# Patient Record
Sex: MALE | Race: WHITE | URBAN - METROPOLITAN AREA
[De-identification: names, ages, dates, MRNs, and addresses within clinical notes are randomized per-mention and may not be internally consistent; named-entity substitution may affect disease eponyms.]

---

## 2018-03-01 ENCOUNTER — APPOINTMENT (RX ONLY)
Dept: URBAN - METROPOLITAN AREA CLINIC 53 | Facility: CLINIC | Age: 73
Setting detail: DERMATOLOGY
End: 2018-03-01

## 2018-03-01 DIAGNOSIS — Z85.828 PERSONAL HISTORY OF OTHER MALIGNANT NEOPLASM OF SKIN: ICD-10-CM

## 2018-03-01 DIAGNOSIS — D22 MELANOCYTIC NEVI: ICD-10-CM

## 2018-03-01 DIAGNOSIS — L81.4 OTHER MELANIN HYPERPIGMENTATION: ICD-10-CM

## 2018-03-01 DIAGNOSIS — L82.1 OTHER SEBORRHEIC KERATOSIS: ICD-10-CM

## 2018-03-01 DIAGNOSIS — L21.8 OTHER SEBORRHEIC DERMATITIS: ICD-10-CM | Status: WELL CONTROLLED

## 2018-03-01 PROBLEM — D22.5 MELANOCYTIC NEVI OF TRUNK: Status: ACTIVE | Noted: 2018-03-01

## 2018-03-01 PROBLEM — I10 ESSENTIAL (PRIMARY) HYPERTENSION: Status: ACTIVE | Noted: 2018-03-01

## 2018-03-01 PROCEDURE — 99213 OFFICE O/P EST LOW 20 MIN: CPT

## 2018-03-01 PROCEDURE — ? COUNSELING

## 2018-03-01 ASSESSMENT — LOCATION SIMPLE DESCRIPTION DERM
LOCATION SIMPLE: SCALP
LOCATION SIMPLE: LEFT CHEEK
LOCATION SIMPLE: LEFT TEMPLE
LOCATION SIMPLE: CHEST
LOCATION SIMPLE: RIGHT UPPER BACK
LOCATION SIMPLE: RIGHT THIGH
LOCATION SIMPLE: UPPER BACK
LOCATION SIMPLE: ABDOMEN
LOCATION SIMPLE: LEFT UPPER ARM
LOCATION SIMPLE: RIGHT UPPER ARM
LOCATION SIMPLE: LEFT THIGH
LOCATION SIMPLE: RIGHT CHEEK

## 2018-03-01 ASSESSMENT — LOCATION DETAILED DESCRIPTION DERM
LOCATION DETAILED: LEFT MEDIAL SUPERIOR CHEST
LOCATION DETAILED: LEFT CENTRAL TEMPLE
LOCATION DETAILED: RIGHT MEDIAL MALAR CHEEK
LOCATION DETAILED: LEFT PROXIMAL POSTERIOR UPPER ARM
LOCATION DETAILED: RIGHT PROXIMAL POSTERIOR UPPER ARM
LOCATION DETAILED: LEFT ANTERIOR PROXIMAL THIGH
LOCATION DETAILED: LEFT SUPERIOR PARIETAL SCALP
LOCATION DETAILED: EPIGASTRIC SKIN
LOCATION DETAILED: INFERIOR THORACIC SPINE
LOCATION DETAILED: LEFT ANTERIOR PROXIMAL UPPER ARM
LOCATION DETAILED: LEFT MEDIAL MALAR CHEEK
LOCATION DETAILED: RIGHT ANTERIOR PROXIMAL UPPER ARM
LOCATION DETAILED: RIGHT ANTERIOR PROXIMAL THIGH
LOCATION DETAILED: RIGHT SUPERIOR MEDIAL UPPER BACK

## 2018-03-01 ASSESSMENT — LOCATION ZONE DERM
LOCATION ZONE: SCALP
LOCATION ZONE: LEG
LOCATION ZONE: ARM
LOCATION ZONE: TRUNK
LOCATION ZONE: FACE
LOCATION ZONE: FACE

## 2019-02-21 ENCOUNTER — APPOINTMENT (RX ONLY)
Dept: URBAN - METROPOLITAN AREA CLINIC 53 | Facility: CLINIC | Age: 74
Setting detail: DERMATOLOGY
End: 2019-02-21

## 2019-02-21 DIAGNOSIS — L57.0 ACTINIC KERATOSIS: ICD-10-CM

## 2019-02-21 DIAGNOSIS — L81.4 OTHER MELANIN HYPERPIGMENTATION: ICD-10-CM

## 2019-02-21 DIAGNOSIS — L82.0 INFLAMED SEBORRHEIC KERATOSIS: ICD-10-CM

## 2019-02-21 DIAGNOSIS — L82.1 OTHER SEBORRHEIC KERATOSIS: ICD-10-CM

## 2019-02-21 DIAGNOSIS — Z85.828 PERSONAL HISTORY OF OTHER MALIGNANT NEOPLASM OF SKIN: ICD-10-CM

## 2019-02-21 PROCEDURE — ? LIQUID NITROGEN

## 2019-02-21 PROCEDURE — ? INVENTORY

## 2019-02-21 PROCEDURE — 17000 DESTRUCT PREMALG LESION: CPT | Mod: 59

## 2019-02-21 PROCEDURE — ? BIOPSY BY SHAVE METHOD

## 2019-02-21 PROCEDURE — 99214 OFFICE O/P EST MOD 30 MIN: CPT | Mod: 25

## 2019-02-21 PROCEDURE — ? COUNSELING

## 2019-02-21 PROCEDURE — 11102 TANGNTL BX SKIN SINGLE LES: CPT

## 2019-02-21 ASSESSMENT — LOCATION SIMPLE DESCRIPTION DERM
LOCATION SIMPLE: RIGHT UPPER BACK
LOCATION SIMPLE: UPPER BACK
LOCATION SIMPLE: RIGHT THIGH
LOCATION SIMPLE: RIGHT CALF

## 2019-02-21 ASSESSMENT — LOCATION DETAILED DESCRIPTION DERM
LOCATION DETAILED: RIGHT SUPERIOR MEDIAL UPPER BACK
LOCATION DETAILED: RIGHT ANTERIOR PROXIMAL THIGH
LOCATION DETAILED: RIGHT PROXIMAL CALF
LOCATION DETAILED: SUPERIOR THORACIC SPINE

## 2019-02-21 ASSESSMENT — LOCATION ZONE DERM
LOCATION ZONE: LEG
LOCATION ZONE: TRUNK

## 2019-02-21 NOTE — PROCEDURE: BIOPSY BY SHAVE METHOD
Cryotherapy Text: The wound bed was treated with cryotherapy after the biopsy was performed.
Destruction After The Procedure: No
Consent: Written consent was obtained and risks were reviewed including but not limited to scarring, infection, bleeding, scabbing, incomplete removal, nerve damage and allergy to anesthesia.
Wound Care: Petrolatum
Lab Facility: [id number]
Additional Anesthesia Volume In Cc (Will Not Render If 0): 0
Anesthesia Type: 1% lidocaine with epinephrine
Depth Of Biopsy: dermis
Electrodesiccation Text: The wound bed was treated with electrodesiccation after the biopsy was performed.
Biopsy Type: H and E
Billing Type: Third-Party Bill
Type Of Destruction Used: Curettage
Dressing: bandage
Electrodesiccation And Curettage Text: The wound bed was treated with electrodesiccation and curettage after the biopsy was performed.
Anesthesia Volume In Cc (Will Not Render If 0): 0.5
Was A Bandage Applied: Yes
Post-Care Instructions: I reviewed with the patient in detail post-care instructions. Patient is to keep the biopsy site dry overnight, and then apply bacitracin twice daily until healed. Patient may apply hydrogen peroxide soaks to remove any crusting.
Curettage Text: The wound bed was treated with curettage after the biopsy was performed.
Notification Instructions: Patient will be notified of biopsy results. However, patient instructed to call the office if not contacted within 2 weeks.
Hemostasis: Electrocautery and Aluminum Chloride
Silver Nitrate Text: The wound bed was treated with silver nitrate after the biopsy was performed.
Lab: 6
Detail Level: Detailed
Biopsy Method: double edge Personna blade

## 2019-02-21 NOTE — PROCEDURE: LIQUID NITROGEN
Consent: The patient's consent was obtained including but not limited to risks of crusting, scabbing, blistering, scarring, darker or lighter pigmentary change, recurrence, incomplete removal and infection.
Detail Level: Detailed
Number Of Freeze-Thaw Cycles: 1 freeze-thaw cycle
Render Post-Care Instructions In Note?: no
Post-Care Instructions: I reviewed with the patient in detail post-care instructions. Patient is to wear sunprotection, and avoid picking at any of the treated lesions. Pt may apply Vaseline to crusted or scabbing areas.
Duration Of Freeze Thaw-Cycle (Seconds): 3

## 2020-02-24 ENCOUNTER — APPOINTMENT (RX ONLY)
Dept: URBAN - METROPOLITAN AREA CLINIC 53 | Facility: CLINIC | Age: 75
Setting detail: DERMATOLOGY
End: 2020-02-24

## 2020-02-24 DIAGNOSIS — L81.4 OTHER MELANIN HYPERPIGMENTATION: ICD-10-CM

## 2020-02-24 DIAGNOSIS — D22 MELANOCYTIC NEVI: ICD-10-CM

## 2020-02-24 DIAGNOSIS — L82.1 OTHER SEBORRHEIC KERATOSIS: ICD-10-CM

## 2020-02-24 DIAGNOSIS — Z85.828 PERSONAL HISTORY OF OTHER MALIGNANT NEOPLASM OF SKIN: ICD-10-CM | Status: RESOLVED

## 2020-02-24 PROBLEM — D22.5 MELANOCYTIC NEVI OF TRUNK: Status: ACTIVE | Noted: 2020-02-24

## 2020-02-24 PROBLEM — D48.5 NEOPLASM OF UNCERTAIN BEHAVIOR OF SKIN: Status: ACTIVE | Noted: 2020-02-24

## 2020-02-24 PROCEDURE — ? BIOPSY BY SHAVE METHOD

## 2020-02-24 PROCEDURE — 99214 OFFICE O/P EST MOD 30 MIN: CPT | Mod: 25

## 2020-02-24 PROCEDURE — ? COUNSELING

## 2020-02-24 PROCEDURE — 11102 TANGNTL BX SKIN SINGLE LES: CPT

## 2020-02-24 PROCEDURE — ? FULL BODY SKIN EXAM

## 2020-02-24 ASSESSMENT — LOCATION DETAILED DESCRIPTION DERM
LOCATION DETAILED: SUPERIOR THORACIC SPINE
LOCATION DETAILED: INFERIOR THORACIC SPINE

## 2020-02-24 ASSESSMENT — LOCATION ZONE DERM: LOCATION ZONE: TRUNK

## 2020-02-24 ASSESSMENT — LOCATION SIMPLE DESCRIPTION DERM: LOCATION SIMPLE: UPPER BACK

## 2020-02-24 NOTE — PROCEDURE: BIOPSY BY SHAVE METHOD
Detail Level: Detailed
Depth Of Biopsy: dermis
Was A Bandage Applied: Yes
Size Of Lesion In Cm: 0.3
X Size Of Lesion In Cm: 0
Anticipated Plan (Based On Presumed Biopsy Results): Mohs
Biopsy Type: H and E
Biopsy Method: double edge Personna blade
Anesthesia Type: 1% lidocaine with epinephrine
Anesthesia Volume In Cc (Will Not Render If 0): 0.5
Hemostasis: Electrocautery and Aluminum Chloride
Wound Care: Petrolatum
Dressing: bandage
Destruction After The Procedure: No
Type Of Destruction Used: Curettage
Curettage Text: The wound bed was treated with curettage after the biopsy was performed.
Cryotherapy Text: The wound bed was treated with cryotherapy after the biopsy was performed.
Electrodesiccation Text: The wound bed was treated with electrodesiccation after the biopsy was performed.
Electrodesiccation And Curettage Text: The wound bed was treated with electrodesiccation and curettage after the biopsy was performed.
Silver Nitrate Text: The wound bed was treated with silver nitrate after the biopsy was performed.
Lab: 6
Lab Facility: [id number]
Consent: Written consent was obtained and risks were reviewed including but not limited to scarring, infection, bleeding, scabbing, incomplete removal, nerve damage and allergy to anesthesia.
Post-Care Instructions: I reviewed with the patient in detail post-care instructions. Patient is to keep the biopsy site dry overnight, and then apply bacitracin twice daily until healed. Patient may apply hydrogen peroxide soaks to remove any crusting.
Notification Instructions: Patient will be notified of biopsy results. However, patient instructed to call the office if not contacted within 2 weeks.
Billing Type: Third-Party Bill

## 2020-03-02 ENCOUNTER — APPOINTMENT (RX ONLY)
Dept: URBAN - METROPOLITAN AREA CLINIC 53 | Facility: CLINIC | Age: 75
Setting detail: DERMATOLOGY
End: 2020-03-02

## 2020-03-02 DIAGNOSIS — Z02.9 ENCOUNTER FOR ADMINISTRATIVE EXAMINATIONS, UNSPECIFIED: ICD-10-CM

## 2020-05-29 ENCOUNTER — APPOINTMENT (RX ONLY)
Dept: URBAN - METROPOLITAN AREA CLINIC 53 | Facility: CLINIC | Age: 75
Setting detail: DERMATOLOGY
End: 2020-05-29

## 2020-05-29 DIAGNOSIS — D485 NEOPLASM OF UNCERTAIN BEHAVIOR OF SKIN: ICD-10-CM

## 2020-05-29 DIAGNOSIS — L82.1 OTHER SEBORRHEIC KERATOSIS: ICD-10-CM

## 2020-05-29 DIAGNOSIS — Z85.828 PERSONAL HISTORY OF OTHER MALIGNANT NEOPLASM OF SKIN: ICD-10-CM

## 2020-05-29 PROBLEM — D48.5 NEOPLASM OF UNCERTAIN BEHAVIOR OF SKIN: Status: ACTIVE | Noted: 2020-05-29

## 2020-05-29 PROCEDURE — 99213 OFFICE O/P EST LOW 20 MIN: CPT | Mod: 25

## 2020-05-29 PROCEDURE — 11102 TANGNTL BX SKIN SINGLE LES: CPT

## 2020-05-29 PROCEDURE — ? BIOPSY BY SHAVE METHOD

## 2020-05-29 PROCEDURE — ? COUNSELING

## 2020-05-29 PROCEDURE — ? FULL BODY SKIN EXAM - DECLINED

## 2020-05-29 ASSESSMENT — LOCATION DETAILED DESCRIPTION DERM
LOCATION DETAILED: LEFT CENTRAL POSTAURICULAR SKIN
LOCATION DETAILED: RIGHT CENTRAL FRONTAL SCALP

## 2020-05-29 ASSESSMENT — LOCATION ZONE DERM: LOCATION ZONE: SCALP

## 2020-05-29 ASSESSMENT — LOCATION SIMPLE DESCRIPTION DERM: LOCATION SIMPLE: SCALP

## 2020-05-29 NOTE — PROCEDURE: BIOPSY BY SHAVE METHOD

## 2021-01-28 ENCOUNTER — APPOINTMENT (RX ONLY)
Dept: URBAN - METROPOLITAN AREA CLINIC 53 | Facility: CLINIC | Age: 76
Setting detail: DERMATOLOGY
End: 2021-01-28

## 2021-01-28 VITALS — TEMPERATURE: 97.6 F

## 2021-01-28 DIAGNOSIS — L71.8 OTHER ROSACEA: ICD-10-CM | Status: INADEQUATELY CONTROLLED

## 2021-01-28 DIAGNOSIS — L81.4 OTHER MELANIN HYPERPIGMENTATION: ICD-10-CM

## 2021-01-28 DIAGNOSIS — D18.0 HEMANGIOMA: ICD-10-CM

## 2021-01-28 DIAGNOSIS — D22 MELANOCYTIC NEVI: ICD-10-CM

## 2021-01-28 DIAGNOSIS — Z85.828 PERSONAL HISTORY OF OTHER MALIGNANT NEOPLASM OF SKIN: ICD-10-CM | Status: RESOLVED

## 2021-01-28 DIAGNOSIS — L82.1 OTHER SEBORRHEIC KERATOSIS: ICD-10-CM

## 2021-01-28 PROBLEM — D18.01 HEMANGIOMA OF SKIN AND SUBCUTANEOUS TISSUE: Status: ACTIVE | Noted: 2021-01-28

## 2021-01-28 PROBLEM — D22.5 MELANOCYTIC NEVI OF TRUNK: Status: ACTIVE | Noted: 2021-01-28

## 2021-01-28 PROCEDURE — 99214 OFFICE O/P EST MOD 30 MIN: CPT

## 2021-01-28 PROCEDURE — ? PRESCRIPTION

## 2021-01-28 PROCEDURE — ? ADDITIONAL NOTES

## 2021-01-28 PROCEDURE — ? SUNSCREEN RECOMMENDATIONS

## 2021-01-28 PROCEDURE — ? FULL BODY SKIN EXAM

## 2021-01-28 PROCEDURE — ? COUNSELING

## 2021-01-28 RX ORDER — DOXYCYCLINE HYCLATE 100 MG/1
TABLET, COATED ORAL BID
Qty: 60 | Refills: 3 | Status: ERX | COMMUNITY
Start: 2021-01-28

## 2021-01-28 RX ADMIN — DOXYCYCLINE HYCLATE: 100 TABLET, COATED ORAL at 00:00

## 2021-01-28 ASSESSMENT — LOCATION DETAILED DESCRIPTION DERM
LOCATION DETAILED: RIGHT INFERIOR MEDIAL UPPER BACK
LOCATION DETAILED: NASAL SUPRATIP
LOCATION DETAILED: NASAL TIP
LOCATION DETAILED: LEFT MID-UPPER BACK
LOCATION DETAILED: PERIUMBILICAL SKIN
LOCATION DETAILED: RIGHT SUPERIOR MEDIAL UPPER BACK
LOCATION DETAILED: RIGHT NASAL ALA
LOCATION DETAILED: SUPERIOR THORACIC SPINE

## 2021-01-28 ASSESSMENT — LOCATION SIMPLE DESCRIPTION DERM
LOCATION SIMPLE: UPPER BACK
LOCATION SIMPLE: ABDOMEN
LOCATION SIMPLE: NOSE
LOCATION SIMPLE: RIGHT NOSE
LOCATION SIMPLE: LEFT UPPER BACK
LOCATION SIMPLE: RIGHT UPPER BACK

## 2021-01-28 ASSESSMENT — LOCATION ZONE DERM
LOCATION ZONE: TRUNK
LOCATION ZONE: NOSE

## 2021-07-15 ENCOUNTER — APPOINTMENT (RX ONLY)
Dept: URBAN - METROPOLITAN AREA CLINIC 53 | Facility: CLINIC | Age: 76
Setting detail: DERMATOLOGY
End: 2021-07-15

## 2021-07-15 DIAGNOSIS — D69.2 OTHER NONTHROMBOCYTOPENIC PURPURA: ICD-10-CM | Status: RESOLVING

## 2021-07-15 DIAGNOSIS — Z85.828 PERSONAL HISTORY OF OTHER MALIGNANT NEOPLASM OF SKIN: ICD-10-CM | Status: RESOLVED

## 2021-07-15 DIAGNOSIS — L81.4 OTHER MELANIN HYPERPIGMENTATION: ICD-10-CM

## 2021-07-15 DIAGNOSIS — D22 MELANOCYTIC NEVI: ICD-10-CM | Status: STABLE

## 2021-07-15 DIAGNOSIS — L82.1 OTHER SEBORRHEIC KERATOSIS: ICD-10-CM | Status: STABLE

## 2021-07-15 PROBLEM — D48.5 NEOPLASM OF UNCERTAIN BEHAVIOR OF SKIN: Status: ACTIVE | Noted: 2021-07-15

## 2021-07-15 PROBLEM — D22.5 MELANOCYTIC NEVI OF TRUNK: Status: ACTIVE | Noted: 2021-07-15

## 2021-07-15 PROCEDURE — 99213 OFFICE O/P EST LOW 20 MIN: CPT | Mod: 25

## 2021-07-15 PROCEDURE — 11102 TANGNTL BX SKIN SINGLE LES: CPT

## 2021-07-15 PROCEDURE — ? FULL BODY SKIN EXAM

## 2021-07-15 PROCEDURE — ? COUNSELING

## 2021-07-15 PROCEDURE — ? BIOPSY BY SHAVE METHOD

## 2021-07-15 ASSESSMENT — LOCATION SIMPLE DESCRIPTION DERM
LOCATION SIMPLE: LEFT FOREARM
LOCATION SIMPLE: NECK
LOCATION SIMPLE: LOWER BACK
LOCATION SIMPLE: RIGHT UPPER BACK
LOCATION SIMPLE: LEFT CHEEK
LOCATION SIMPLE: RIGHT POSTERIOR THIGH
LOCATION SIMPLE: CHEST
LOCATION SIMPLE: RIGHT FOREARM
LOCATION SIMPLE: LEFT THIGH

## 2021-07-15 ASSESSMENT — LOCATION ZONE DERM
LOCATION ZONE: TRUNK
LOCATION ZONE: NECK
LOCATION ZONE: LEG
LOCATION ZONE: ARM
LOCATION ZONE: FACE

## 2021-07-15 ASSESSMENT — SEVERITY ASSESSMENT: SEVERITY: MILD TO MODERATE

## 2021-07-15 ASSESSMENT — LOCATION DETAILED DESCRIPTION DERM
LOCATION DETAILED: LEFT ANTERIOR PROXIMAL THIGH
LOCATION DETAILED: STERNUM
LOCATION DETAILED: RIGHT DISTAL POSTERIOR THIGH
LOCATION DETAILED: RIGHT PROXIMAL DORSAL FOREARM
LOCATION DETAILED: RIGHT SUPERIOR MEDIAL UPPER BACK
LOCATION DETAILED: SUPERIOR LUMBAR SPINE
LOCATION DETAILED: LEFT SUPERIOR CENTRAL MALAR CHEEK
LOCATION DETAILED: RIGHT SUPERIOR LATERAL NECK
LOCATION DETAILED: LEFT DISTAL DORSAL FOREARM
LOCATION DETAILED: LEFT VENTRAL PROXIMAL FOREARM
LOCATION DETAILED: RIGHT DISTAL RADIAL DORSAL FOREARM

## 2021-07-15 ASSESSMENT — PAIN INTENSITY VAS: HOW INTENSE IS YOUR PAIN 0 BEING NO PAIN, 10 BEING THE MOST SEVERE PAIN POSSIBLE?: NO PAIN

## 2021-12-21 ENCOUNTER — APPOINTMENT (RX ONLY)
Dept: URBAN - METROPOLITAN AREA CLINIC 53 | Facility: CLINIC | Age: 76
Setting detail: DERMATOLOGY
End: 2021-12-21

## 2021-12-21 DIAGNOSIS — L81.4 OTHER MELANIN HYPERPIGMENTATION: ICD-10-CM

## 2021-12-21 DIAGNOSIS — L82.1 OTHER SEBORRHEIC KERATOSIS: ICD-10-CM

## 2021-12-21 DIAGNOSIS — D18.0 HEMANGIOMA: ICD-10-CM

## 2021-12-21 DIAGNOSIS — L21.8 OTHER SEBORRHEIC DERMATITIS: ICD-10-CM | Status: STABLE

## 2021-12-21 DIAGNOSIS — D69.2 OTHER NONTHROMBOCYTOPENIC PURPURA: ICD-10-CM | Status: INADEQUATELY CONTROLLED

## 2021-12-21 DIAGNOSIS — L71.8 OTHER ROSACEA: ICD-10-CM | Status: IMPROVED

## 2021-12-21 DIAGNOSIS — D22 MELANOCYTIC NEVI: ICD-10-CM

## 2021-12-21 PROBLEM — D22.5 MELANOCYTIC NEVI OF TRUNK: Status: ACTIVE | Noted: 2021-12-21

## 2021-12-21 PROBLEM — D48.5 NEOPLASM OF UNCERTAIN BEHAVIOR OF SKIN: Status: ACTIVE | Noted: 2021-12-21

## 2021-12-21 PROBLEM — D18.01 HEMANGIOMA OF SKIN AND SUBCUTANEOUS TISSUE: Status: ACTIVE | Noted: 2021-12-21

## 2021-12-21 PROCEDURE — ? PRESCRIPTION

## 2021-12-21 PROCEDURE — ? COUNSELING

## 2021-12-21 PROCEDURE — ? TREATMENT REGIMEN

## 2021-12-21 PROCEDURE — ? BIOPSY BY SHAVE METHOD

## 2021-12-21 PROCEDURE — 99214 OFFICE O/P EST MOD 30 MIN: CPT | Mod: 25

## 2021-12-21 PROCEDURE — ? FULL BODY SKIN EXAM

## 2021-12-21 PROCEDURE — 11102 TANGNTL BX SKIN SINGLE LES: CPT

## 2021-12-21 RX ORDER — TRIAMCINOLONE ACETONIDE 1 MG/G
CREAM TOPICAL BID
Qty: 454 | Refills: 3 | Status: ERX | COMMUNITY
Start: 2021-12-21

## 2021-12-21 RX ADMIN — TRIAMCINOLONE ACETONIDE: 1 CREAM TOPICAL at 00:00

## 2021-12-21 ASSESSMENT — LOCATION DETAILED DESCRIPTION DERM
LOCATION DETAILED: RIGHT DISTAL DORSAL FOREARM
LOCATION DETAILED: GLABELLA
LOCATION DETAILED: LEFT ANTERIOR PROXIMAL UPPER ARM
LOCATION DETAILED: RIGHT INFERIOR CENTRAL MALAR CHEEK
LOCATION DETAILED: RIGHT ANTERIOR DISTAL UPPER ARM
LOCATION DETAILED: LEFT MEDIAL MALAR CHEEK
LOCATION DETAILED: RIGHT ANTERIOR PROXIMAL UPPER ARM
LOCATION DETAILED: LEFT DISTAL POSTERIOR UPPER ARM
LOCATION DETAILED: STERNUM
LOCATION DETAILED: RIGHT PROXIMAL POSTERIOR UPPER ARM
LOCATION DETAILED: LEFT ANTERIOR DISTAL UPPER ARM
LOCATION DETAILED: LEFT DISTAL DORSAL FOREARM
LOCATION DETAILED: RIGHT MEDIAL MALAR CHEEK
LOCATION DETAILED: INFERIOR THORACIC SPINE
LOCATION DETAILED: PERIUMBILICAL SKIN
LOCATION DETAILED: EPIGASTRIC SKIN

## 2021-12-21 ASSESSMENT — LOCATION ZONE DERM
LOCATION ZONE: FACE
LOCATION ZONE: ARM
LOCATION ZONE: TRUNK

## 2021-12-21 ASSESSMENT — LOCATION SIMPLE DESCRIPTION DERM
LOCATION SIMPLE: LEFT FOREARM
LOCATION SIMPLE: LEFT CHEEK
LOCATION SIMPLE: RIGHT FOREARM
LOCATION SIMPLE: ABDOMEN
LOCATION SIMPLE: CHEST
LOCATION SIMPLE: RIGHT UPPER ARM
LOCATION SIMPLE: LEFT UPPER ARM
LOCATION SIMPLE: UPPER BACK
LOCATION SIMPLE: GLABELLA
LOCATION SIMPLE: RIGHT CHEEK

## 2022-06-21 ENCOUNTER — APPOINTMENT (RX ONLY)
Dept: URBAN - METROPOLITAN AREA CLINIC 53 | Facility: CLINIC | Age: 77
Setting detail: DERMATOLOGY
End: 2022-06-21

## 2022-06-21 DIAGNOSIS — D22 MELANOCYTIC NEVI: ICD-10-CM

## 2022-06-21 DIAGNOSIS — L29.89 OTHER PRURITUS: ICD-10-CM | Status: INADEQUATELY CONTROLLED

## 2022-06-21 DIAGNOSIS — Z85.828 PERSONAL HISTORY OF OTHER MALIGNANT NEOPLASM OF SKIN: ICD-10-CM | Status: RESOLVED

## 2022-06-21 DIAGNOSIS — L29.8 OTHER PRURITUS: ICD-10-CM | Status: INADEQUATELY CONTROLLED

## 2022-06-21 DIAGNOSIS — L81.4 OTHER MELANIN HYPERPIGMENTATION: ICD-10-CM

## 2022-06-21 DIAGNOSIS — L82.1 OTHER SEBORRHEIC KERATOSIS: ICD-10-CM

## 2022-06-21 PROBLEM — D22.5 MELANOCYTIC NEVI OF TRUNK: Status: ACTIVE | Noted: 2022-06-21

## 2022-06-21 PROBLEM — D48.5 NEOPLASM OF UNCERTAIN BEHAVIOR OF SKIN: Status: ACTIVE | Noted: 2022-06-21

## 2022-06-21 PROBLEM — L29.9 PRURITUS, UNSPECIFIED: Status: ACTIVE | Noted: 2022-06-21

## 2022-06-21 PROBLEM — D22.62 MELANOCYTIC NEVI OF LEFT UPPER LIMB, INCLUDING SHOULDER: Status: ACTIVE | Noted: 2022-06-21

## 2022-06-21 PROCEDURE — 11102 TANGNTL BX SKIN SINGLE LES: CPT

## 2022-06-21 PROCEDURE — ? FULL BODY SKIN EXAM

## 2022-06-21 PROCEDURE — ? COUNSELING

## 2022-06-21 PROCEDURE — ? SUNSCREEN RECOMMENDATIONS

## 2022-06-21 PROCEDURE — 99213 OFFICE O/P EST LOW 20 MIN: CPT | Mod: 25

## 2022-06-21 PROCEDURE — ? BIOPSY BY SHAVE METHOD

## 2022-06-21 ASSESSMENT — LOCATION ZONE DERM
LOCATION ZONE: FACE
LOCATION ZONE: ARM
LOCATION ZONE: TRUNK

## 2022-06-21 ASSESSMENT — LOCATION DETAILED DESCRIPTION DERM
LOCATION DETAILED: SUPERIOR THORACIC SPINE
LOCATION DETAILED: SUPERIOR MID FOREHEAD
LOCATION DETAILED: RIGHT INFERIOR MEDIAL UPPER BACK
LOCATION DETAILED: LEFT SUPERIOR UPPER BACK
LOCATION DETAILED: LEFT ANTERIOR SHOULDER
LOCATION DETAILED: RIGHT SUPERIOR UPPER BACK
LOCATION DETAILED: LEFT SUPERIOR MEDIAL FOREHEAD

## 2022-06-21 ASSESSMENT — LOCATION SIMPLE DESCRIPTION DERM
LOCATION SIMPLE: LEFT FOREHEAD
LOCATION SIMPLE: UPPER BACK
LOCATION SIMPLE: LEFT UPPER BACK
LOCATION SIMPLE: LEFT SHOULDER
LOCATION SIMPLE: SUPERIOR FOREHEAD
LOCATION SIMPLE: RIGHT UPPER BACK

## 2022-06-21 ASSESSMENT — ITCH INTENSITY: HOW SEVERE IS YOUR ITCHING?: 4

## 2022-06-21 NOTE — PROCEDURE: BIOPSY BY SHAVE METHOD
Detail Level: Detailed
Depth Of Biopsy: dermis
Was A Bandage Applied: Yes
Size Of Lesion In Cm: 2.5
X Size Of Lesion In Cm: 0
Anticipated Plan (Based On Presumed Biopsy Results): Exc with Ron
Biopsy Type: H and E
Biopsy Method: double edge Personna blade
Anesthesia Type: 1% lidocaine with epinephrine
Anesthesia Volume In Cc (Will Not Render If 0): 0.5
Hemostasis: Electrocautery and Aluminum Chloride
Wound Care: Petrolatum
Dressing: bandage
Destruction After The Procedure: No
Type Of Destruction Used: Curettage
Curettage Text: The wound bed was treated with curettage after the biopsy was performed.
Cryotherapy Text: The wound bed was treated with cryotherapy after the biopsy was performed.
Electrodesiccation Text: The wound bed was treated with electrodesiccation after the biopsy was performed.
Electrodesiccation And Curettage Text: The wound bed was treated with electrodesiccation and curettage after the biopsy was performed.
Silver Nitrate Text: The wound bed was treated with silver nitrate after the biopsy was performed.
Lab: 6
Consent: Written consent was obtained and risks were reviewed including but not limited to scarring, infection, bleeding, scabbing, incomplete removal, nerve damage and allergy to anesthesia.
Post-Care Instructions: I reviewed with the patient in detail post-care instructions. Patient is to keep the biopsy site dry overnight, and then apply bacitracin twice daily until healed. Patient may apply hydrogen peroxide soaks to remove any crusting.
Notification Instructions: Patient will be notified of biopsy results. However, patient instructed to call the office if not contacted within 2 weeks.
Billing Type: Third-Party Bill
Information: Selecting Yes will display possible errors in your note based on the variables you have selected. This validation is only offered as a suggestion for you. PLEASE NOTE THAT THE VALIDATION TEXT WILL BE REMOVED WHEN YOU FINALIZE YOUR NOTE. IF YOU WANT TO FAX A PRELIMINARY NOTE YOU WILL NEED TO TOGGLE THIS TO 'NO' IF YOU DO NOT WANT IT IN YOUR FAXED NOTE.

## 2022-06-21 NOTE — PROCEDURE: COUNSELING
Detail Level: Detailed
Detail Level: Simple
Patient Specific Counseling (Will Not Stick From Patient To Patient): Just use triamcinolone going forward
Detail Level: Generalized

## 2022-07-06 ENCOUNTER — APPOINTMENT (RX ONLY)
Dept: URBAN - METROPOLITAN AREA CLINIC 53 | Facility: CLINIC | Age: 77
Setting detail: DERMATOLOGY
End: 2022-07-06

## 2022-07-06 PROBLEM — C44.712 BASAL CELL CARCINOMA OF SKIN OF RIGHT LOWER LIMB, INCLUDING HIP: Status: ACTIVE | Noted: 2022-07-06

## 2022-07-06 PROCEDURE — ? PRESCRIPTION

## 2022-07-06 PROCEDURE — 13122 CMPLX RPR S/A/L ADDL 5 CM/>: CPT

## 2022-07-06 PROCEDURE — ? EXCISION

## 2022-07-06 PROCEDURE — 11604 EXC TR-EXT MAL+MARG 3.1-4 CM: CPT

## 2022-07-06 PROCEDURE — 13121 CMPLX RPR S/A/L 2.6-7.5 CM: CPT

## 2022-07-06 RX ORDER — MUPIROCIN 20 MG/G
OINTMENT TOPICAL BID
Qty: 22 | Refills: 1 | Status: ERX | COMMUNITY
Start: 2022-07-06

## 2022-07-06 RX ADMIN — MUPIROCIN: 20 OINTMENT TOPICAL at 00:00

## 2022-07-20 ENCOUNTER — APPOINTMENT (RX ONLY)
Dept: URBAN - METROPOLITAN AREA CLINIC 53 | Facility: CLINIC | Age: 77
Setting detail: DERMATOLOGY
End: 2022-07-20

## 2022-07-20 DIAGNOSIS — Z48.02 ENCOUNTER FOR REMOVAL OF SUTURES: ICD-10-CM

## 2022-07-20 PROCEDURE — 99024 POSTOP FOLLOW-UP VISIT: CPT

## 2022-07-20 PROCEDURE — ? SUTURE REMOVAL (GLOBAL PERIOD)

## 2022-07-20 ASSESSMENT — LOCATION ZONE DERM: LOCATION ZONE: LEG

## 2022-07-20 ASSESSMENT — LOCATION SIMPLE DESCRIPTION DERM: LOCATION SIMPLE: RIGHT THIGH

## 2022-07-20 ASSESSMENT — LOCATION DETAILED DESCRIPTION DERM: LOCATION DETAILED: RIGHT ANTERIOR PROXIMAL THIGH

## 2022-07-20 NOTE — PROCEDURE: SUTURE REMOVAL (GLOBAL PERIOD)
Detail Level: Zone
Add 00281 Cpt? (Important Note: In 2017 The Use Of 04703 Is Being Tracked By Cms To Determine Future Global Period Reimbursement For Global Periods): yes

## 2022-12-20 ENCOUNTER — APPOINTMENT (RX ONLY)
Dept: URBAN - METROPOLITAN AREA CLINIC 53 | Facility: CLINIC | Age: 77
Setting detail: DERMATOLOGY
End: 2022-12-20

## 2022-12-20 DIAGNOSIS — D22 MELANOCYTIC NEVI: ICD-10-CM

## 2022-12-20 DIAGNOSIS — L81.4 OTHER MELANIN HYPERPIGMENTATION: ICD-10-CM

## 2022-12-20 DIAGNOSIS — Z85.828 PERSONAL HISTORY OF OTHER MALIGNANT NEOPLASM OF SKIN: ICD-10-CM | Status: RESOLVED

## 2022-12-20 DIAGNOSIS — L82.1 OTHER SEBORRHEIC KERATOSIS: ICD-10-CM

## 2022-12-20 PROBLEM — D22.5 MELANOCYTIC NEVI OF TRUNK: Status: ACTIVE | Noted: 2022-12-20

## 2022-12-20 PROCEDURE — ? SUNSCREEN RECOMMENDATIONS

## 2022-12-20 PROCEDURE — ? FULL BODY SKIN EXAM

## 2022-12-20 PROCEDURE — ? COUNSELING

## 2022-12-20 PROCEDURE — 99213 OFFICE O/P EST LOW 20 MIN: CPT

## 2022-12-20 ASSESSMENT — LOCATION DETAILED DESCRIPTION DERM
LOCATION DETAILED: LEFT SUPERIOR UPPER BACK
LOCATION DETAILED: LEFT CLAVICULAR SKIN
LOCATION DETAILED: LEFT ANTERIOR SHOULDER
LOCATION DETAILED: LEFT MID-UPPER BACK
LOCATION DETAILED: LEFT INFERIOR UPPER BACK

## 2022-12-20 ASSESSMENT — LOCATION SIMPLE DESCRIPTION DERM
LOCATION SIMPLE: LEFT UPPER BACK
LOCATION SIMPLE: LEFT SHOULDER
LOCATION SIMPLE: LEFT CLAVICULAR SKIN

## 2022-12-20 ASSESSMENT — LOCATION ZONE DERM
LOCATION ZONE: ARM
LOCATION ZONE: TRUNK

## 2023-06-20 ENCOUNTER — APPOINTMENT (RX ONLY)
Dept: URBAN - METROPOLITAN AREA CLINIC 53 | Facility: CLINIC | Age: 78
Setting detail: DERMATOLOGY
End: 2023-06-20

## 2023-06-20 DIAGNOSIS — D69.2 OTHER NONTHROMBOCYTOPENIC PURPURA: ICD-10-CM | Status: INADEQUATELY CONTROLLED

## 2023-06-20 DIAGNOSIS — L71.8 OTHER ROSACEA: ICD-10-CM | Status: INADEQUATELY CONTROLLED

## 2023-06-20 PROBLEM — L30.9 DERMATITIS, UNSPECIFIED: Status: ACTIVE | Noted: 2023-06-20

## 2023-06-20 PROCEDURE — ? PRESCRIPTION

## 2023-06-20 PROCEDURE — 99213 OFFICE O/P EST LOW 20 MIN: CPT

## 2023-06-20 PROCEDURE — ? COUNSELING

## 2023-06-20 RX ORDER — METRONIDAZOLE 7.5 MG/G
CREAM TOPICAL BID
Qty: 45 | Refills: 10 | Status: ERX | COMMUNITY
Start: 2023-06-20

## 2023-06-20 RX ADMIN — METRONIDAZOLE: 7.5 CREAM TOPICAL at 00:00

## 2023-06-20 ASSESSMENT — LOCATION DETAILED DESCRIPTION DERM
LOCATION DETAILED: LEFT DISTAL DORSAL FOREARM
LOCATION DETAILED: RIGHT DISTAL DORSAL FOREARM
LOCATION DETAILED: LEFT INFERIOR CENTRAL MALAR CHEEK
LOCATION DETAILED: RIGHT INFERIOR LATERAL MALAR CHEEK

## 2023-06-20 ASSESSMENT — LOCATION SIMPLE DESCRIPTION DERM
LOCATION SIMPLE: RIGHT FOREARM
LOCATION SIMPLE: RIGHT CHEEK
LOCATION SIMPLE: LEFT FOREARM
LOCATION SIMPLE: LEFT CHEEK

## 2023-06-20 ASSESSMENT — LOCATION ZONE DERM
LOCATION ZONE: FACE
LOCATION ZONE: ARM

## 2023-09-14 ENCOUNTER — APPOINTMENT (RX ONLY)
Dept: URBAN - METROPOLITAN AREA CLINIC 53 | Facility: CLINIC | Age: 78
Setting detail: DERMATOLOGY
End: 2023-09-14

## 2023-09-14 DIAGNOSIS — L57.8 OTHER SKIN CHANGES DUE TO CHRONIC EXPOSURE TO NONIONIZING RADIATION: ICD-10-CM | Status: STABLE

## 2023-09-14 DIAGNOSIS — Z85.828 PERSONAL HISTORY OF OTHER MALIGNANT NEOPLASM OF SKIN: ICD-10-CM | Status: RESOLVED

## 2023-09-14 PROBLEM — D48.5 NEOPLASM OF UNCERTAIN BEHAVIOR OF SKIN: Status: ACTIVE | Noted: 2023-09-14

## 2023-09-14 PROCEDURE — ? BIOPSY BY SHAVE METHOD

## 2023-09-14 PROCEDURE — ? FULL BODY SKIN EXAM - DECLINED

## 2023-09-14 PROCEDURE — 99213 OFFICE O/P EST LOW 20 MIN: CPT | Mod: 25

## 2023-09-14 PROCEDURE — 11102 TANGNTL BX SKIN SINGLE LES: CPT

## 2023-09-14 PROCEDURE — ? SUNSCREEN RECOMMENDATIONS

## 2023-09-14 PROCEDURE — ? COUNSELING

## 2023-09-14 ASSESSMENT — LOCATION DETAILED DESCRIPTION DERM
LOCATION DETAILED: RIGHT INFERIOR CENTRAL MALAR CHEEK
LOCATION DETAILED: LEFT INFERIOR CENTRAL MALAR CHEEK

## 2023-09-14 ASSESSMENT — LOCATION SIMPLE DESCRIPTION DERM
LOCATION SIMPLE: LEFT CHEEK
LOCATION SIMPLE: RIGHT CHEEK

## 2023-09-14 ASSESSMENT — LOCATION ZONE DERM: LOCATION ZONE: FACE

## 2024-04-02 ENCOUNTER — APPOINTMENT (RX ONLY)
Dept: URBAN - METROPOLITAN AREA CLINIC 53 | Facility: CLINIC | Age: 79
Setting detail: DERMATOLOGY
End: 2024-04-02

## 2024-04-02 DIAGNOSIS — L82.0 INFLAMED SEBORRHEIC KERATOSIS: ICD-10-CM

## 2024-04-02 DIAGNOSIS — D22 MELANOCYTIC NEVI: ICD-10-CM

## 2024-04-02 DIAGNOSIS — L82.1 OTHER SEBORRHEIC KERATOSIS: ICD-10-CM

## 2024-04-02 PROBLEM — D48.5 NEOPLASM OF UNCERTAIN BEHAVIOR OF SKIN: Status: ACTIVE | Noted: 2024-04-02

## 2024-04-02 PROBLEM — D22.71 MELANOCYTIC NEVI OF RIGHT LOWER LIMB, INCLUDING HIP: Status: ACTIVE | Noted: 2024-04-02

## 2024-04-02 PROCEDURE — 11102 TANGNTL BX SKIN SINGLE LES: CPT

## 2024-04-02 PROCEDURE — ? BIOPSY BY SHAVE METHOD

## 2024-04-02 PROCEDURE — ? FULL BODY SKIN EXAM - DECLINED

## 2024-04-02 PROCEDURE — 99213 OFFICE O/P EST LOW 20 MIN: CPT | Mod: 25

## 2024-04-02 PROCEDURE — ? COUNSELING

## 2024-04-02 ASSESSMENT — LOCATION SIMPLE DESCRIPTION DERM: LOCATION SIMPLE: RIGHT CALF

## 2024-04-02 ASSESSMENT — LOCATION DETAILED DESCRIPTION DERM
LOCATION DETAILED: RIGHT PROXIMAL CALF
LOCATION DETAILED: RIGHT DISTAL CALF

## 2024-04-02 ASSESSMENT — LOCATION ZONE DERM: LOCATION ZONE: LEG

## 2024-11-07 ENCOUNTER — APPOINTMENT (RX ONLY)
Dept: URBAN - METROPOLITAN AREA CLINIC 53 | Facility: CLINIC | Age: 79
Setting detail: DERMATOLOGY
End: 2024-11-07

## 2024-11-07 DIAGNOSIS — L71.8 OTHER ROSACEA: ICD-10-CM | Status: STABLE

## 2024-11-07 PROCEDURE — ? FULL BODY SKIN EXAM - DECLINED

## 2024-11-07 PROCEDURE — 99213 OFFICE O/P EST LOW 20 MIN: CPT

## 2024-11-07 PROCEDURE — ? PRESCRIPTION

## 2024-11-07 PROCEDURE — ? COUNSELING

## 2024-11-07 PROCEDURE — ? TREATMENT REGIMEN

## 2024-11-07 RX ORDER — TRIAMCINOLONE ACETONIDE 1 MG/G
CREAM TOPICAL BID
Qty: 80 | Refills: 11 | Status: ERX

## 2024-11-07 ASSESSMENT — LOCATION ZONE DERM: LOCATION ZONE: FACE

## 2024-11-07 ASSESSMENT — LOCATION DETAILED DESCRIPTION DERM
LOCATION DETAILED: LEFT CENTRAL MALAR CHEEK
LOCATION DETAILED: RIGHT INFERIOR CENTRAL MALAR CHEEK

## 2024-11-07 ASSESSMENT — LOCATION SIMPLE DESCRIPTION DERM
LOCATION SIMPLE: RIGHT CHEEK
LOCATION SIMPLE: LEFT CHEEK

## 2025-05-15 ENCOUNTER — APPOINTMENT (OUTPATIENT)
Dept: URBAN - METROPOLITAN AREA CLINIC 53 | Facility: CLINIC | Age: 80
Setting detail: DERMATOLOGY
End: 2025-05-15

## 2025-05-15 DIAGNOSIS — D22 MELANOCYTIC NEVI: ICD-10-CM

## 2025-05-15 DIAGNOSIS — L57.0 ACTINIC KERATOSIS: ICD-10-CM

## 2025-05-15 DIAGNOSIS — L81.4 OTHER MELANIN HYPERPIGMENTATION: ICD-10-CM

## 2025-05-15 DIAGNOSIS — L82.1 OTHER SEBORRHEIC KERATOSIS: ICD-10-CM

## 2025-05-15 DIAGNOSIS — Z85.828 PERSONAL HISTORY OF OTHER MALIGNANT NEOPLASM OF SKIN: ICD-10-CM

## 2025-05-15 PROBLEM — D22.5 MELANOCYTIC NEVI OF TRUNK: Status: ACTIVE | Noted: 2025-05-15

## 2025-05-15 PROCEDURE — 17000 DESTRUCT PREMALG LESION: CPT

## 2025-05-15 PROCEDURE — ? SUNSCREEN RECOMMENDATIONS

## 2025-05-15 PROCEDURE — ? COUNSELING

## 2025-05-15 PROCEDURE — ? LIQUID NITROGEN

## 2025-05-15 PROCEDURE — 17003 DESTRUCT PREMALG LES 2-14: CPT

## 2025-05-15 PROCEDURE — ? FULL BODY SKIN EXAM

## 2025-05-15 PROCEDURE — 99213 OFFICE O/P EST LOW 20 MIN: CPT | Mod: 25

## 2025-05-15 ASSESSMENT — LOCATION SIMPLE DESCRIPTION DERM
LOCATION SIMPLE: LEFT SHOULDER
LOCATION SIMPLE: RIGHT FOREHEAD
LOCATION SIMPLE: LEFT CLAVICULAR SKIN
LOCATION SIMPLE: SUPERIOR FOREHEAD
LOCATION SIMPLE: LEFT FOREHEAD
LOCATION SIMPLE: LEFT UPPER BACK
LOCATION SIMPLE: LEFT SCALP

## 2025-05-15 ASSESSMENT — LOCATION ZONE DERM
LOCATION ZONE: ARM
LOCATION ZONE: TRUNK
LOCATION ZONE: FACE
LOCATION ZONE: SCALP

## 2025-05-15 ASSESSMENT — LOCATION DETAILED DESCRIPTION DERM
LOCATION DETAILED: LEFT MID-UPPER BACK
LOCATION DETAILED: SUPERIOR MID FOREHEAD
LOCATION DETAILED: LEFT INFERIOR UPPER BACK
LOCATION DETAILED: LEFT SUPERIOR UPPER BACK
LOCATION DETAILED: RIGHT SUPERIOR MEDIAL FOREHEAD
LOCATION DETAILED: LEFT LATERAL FOREHEAD
LOCATION DETAILED: LEFT CLAVICULAR SKIN
LOCATION DETAILED: LEFT SUPERIOR MEDIAL FOREHEAD
LOCATION DETAILED: LEFT ANTERIOR SHOULDER
LOCATION DETAILED: RIGHT SUPERIOR FOREHEAD
LOCATION DETAILED: LEFT MEDIAL FRONTAL SCALP
LOCATION DETAILED: LEFT FOREHEAD
LOCATION DETAILED: RIGHT LATERAL FOREHEAD
LOCATION DETAILED: LEFT SUPERIOR FOREHEAD